# Patient Record
Sex: MALE | Race: ASIAN
[De-identification: names, ages, dates, MRNs, and addresses within clinical notes are randomized per-mention and may not be internally consistent; named-entity substitution may affect disease eponyms.]

---
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
## 2024-08-04 DIAGNOSIS — G43.109 MIGRAINE WITH AURA AND WITHOUT STATUS MIGRAINOSUS, NOT INTRACTABLE: ICD-10-CM

## 2024-08-05 RX ORDER — RIZATRIPTAN BENZOATE 10 MG/1
10 TABLET, ORALLY DISINTEGRATING ORAL ONCE AS NEEDED
Qty: 10 TABLET | Refills: 1 | Status: SHIPPED | OUTPATIENT
Start: 2024-08-05

## 2024-08-14 ENCOUNTER — TELEPHONE (OUTPATIENT)
Dept: NEUROLOGY | Facility: CLINIC | Age: 19
End: 2024-08-14

## 2024-08-14 NOTE — TELEPHONE ENCOUNTER
Spoke to patient mom, I confirmed their upcoming appointment. Patient was informed of date/time and location of visit.

## 2024-08-21 ENCOUNTER — OFFICE VISIT (OUTPATIENT)
Dept: NEUROLOGY | Facility: CLINIC | Age: 19
End: 2024-08-21
Payer: MEDICARE

## 2024-08-21 VITALS
TEMPERATURE: 98 F | WEIGHT: 166.7 LBS | DIASTOLIC BLOOD PRESSURE: 68 MMHG | HEART RATE: 59 BPM | OXYGEN SATURATION: 98 % | SYSTOLIC BLOOD PRESSURE: 112 MMHG | BODY MASS INDEX: 25.35 KG/M2

## 2024-08-21 DIAGNOSIS — G47.00 INSOMNIA: ICD-10-CM

## 2024-08-21 DIAGNOSIS — G43.109 MIGRAINE WITH AURA AND WITHOUT STATUS MIGRAINOSUS, NOT INTRACTABLE: Primary | ICD-10-CM

## 2024-08-21 PROCEDURE — 99213 OFFICE O/P EST LOW 20 MIN: CPT

## 2024-08-21 RX ORDER — RIZATRIPTAN BENZOATE 10 MG/1
10 TABLET, ORALLY DISINTEGRATING ORAL ONCE AS NEEDED
Qty: 10 TABLET | Refills: 3 | Status: SHIPPED | OUTPATIENT
Start: 2024-08-21

## 2024-08-21 NOTE — PROGRESS NOTES
[facility]  PATIENT:  Zachary Alexandre  MRN:  [id number]  :  2005  DATE OF SERVICE:  2024      Assessment/Plan:     Migraine with aura and without status migrainosus:    I had the pleasure of seeing Zachary today in the office at [facility] in [place]. He is presenting today for an office visit follow-up in regard to his migraine headaches. The patient had been previously following with Dr. Marion for his migraine headaches. The patient is currently taking magnesium 400 mg daily and riboflavin 400 mg daily for migraine preventative therapy.  He is taking Maxalt disintegrating tablets as needed for migraine abortive therapy.  The patient states since the last appointment he has only approximately 2 migraine headaches a month, and that he notes that Maxalt usually helps control the headaches.  It does not completely abort the headaches for certain but it is helping decrease the severity of them.  The patient is fine with magnesium and B2 supplementation and he does not want to try any other additional preventative medications at this time.  The patient will follow-up in 9 months time with Dr. Marion, if any questions or concerns he can always let myself or Dr. Marion now in the future.    Patient Instructions:    Headache Calendar  Please maintain a headache calendar  Consider using phone applications such as Migraine Buddy or Migraine Diary    Headache/migraine treatment:     Rescue medications (for immediate treatment of a headache):   It is ok to take ibuprofen, acetaminophen or naproxen (Advil, Tylenol,  Aleve, Excedrin) if they help your headaches you should limit these to No more than 3 times a week to avoid medication overuse/rebound headaches.     For your more moderate to severe migraines take this medication early   - Continue Maxalt-MLT (rizatriptan) 10mg disintegrating tabs - take one at the onset of headache. May repeat one time after 2 hours if pain has  not resolved.   (Max 2 a day and 10 a month)     Over the counter preventive supplements for headaches/migraines (if you try, try for 3 months straight)  (to take every day to help prevent headaches - not to take at the time of headache):  There are combo pills online of these - none of which regulated by FDA and double check dosing - take appropriate dose only once a day- prevent a migraine, migravent, mind ease, migrelief   [x] Magnesium 400mg daily (If any diarrhea or upset stomach, decrease dose  as tolerated)  [x] Riboflavin (Vitamin B2) 400mg daily (may make your urine bright/neon yellow)  - All supplements can be purchased online    Lifestyle Recommendations:  [x] SLEEP - Maintain a regular sleep schedule: Adults need at least 7-8 hours of uninterrupted a night. Maintain good sleep hygiene:  Going to bed and waking up at consistent times, avoiding excessive daytime naps, avoiding caffeinated beverages in the evening, avoid excessive stimulation in the evening and generally using bed primarily for sleeping.  One hour before bedtime would recommend turning lights down lower, decreasing your activity (may read quietly, listen to music at a low volume). When you get into bed, should eliminate all technology (no texting, emailing, playing with your phone, iPad or tablet in bed).  [x] HYDRATION - Maintain good hydration.  Drink  2L of fluid a day (4 typical small water bottles)  [x] DIET - Maintain good nutrition. In particular don't skip meals and try and eat healthy balanced meals regularly.  [x] TRIGGERS - Look for other triggers and avoid them: Limit caffeine to 1-2 cups a day or less. Avoid dietary triggers that you have noticed bring on your headaches (this could include aged cheese, peanuts, MSG, aspartame and nitrates).  [x] EXERCISE - physical exercise as we all know is good for you in many ways, and not only is good for your heart, but also is beneficial for your mental health, cognitive health and   chronic pain/headaches. I would encourage at the least 5 days of physical exercise weekly for at least 30 minutes.     Education and Follow-up  [x] Please call with any questions or concerns. Of course if any new concerning symptoms go to the emergency department.  [x] Follow up in 9 months with Dr. Marion      History of Present Illness:     For Review:    We had the pleasure of evaluating Zachary Alexandre in neurological follow up  today for headaches.  As you know,  he is a 18 y.o.   male with a past history of migraine headaches. Patient was last seen in the office at [facility] on 11/20/2023 by Dr. Marion.  When the patient was last seen by Dr. Marion, it is noted that his headache frequency was about 4 days out of the month.  The patient continued with magnesium and B2 supplementation without any issues.  For abortive therapy, patient was still taking rizatriptan which seem to be working well for him.  He had not tried to combine Tylenol with the medication which she was encouraged to do so.  Patient still struggles with issues of insomnia and pending follow-up with sleep medicine month.  The patient was to continue with magnesium and B2 supplementation for preventative therapy at this time as well.    Current medical illnesses: asthma, von Willebrand's disease, autism, OCD, ADHD, history of sleep apnea       What medications do you take or have you taken for your headaches?   Current Preventive:   Mg and B2, Prozac, hydroxyzine (mood), clonidine    Current Abortive:   Rizatriptan 10 mg     Prior Preventive:   Abilify, Failed amitriptyline due to worsening headaches and sedation, avoid beta-blockers due to history of asthma and bradycardia     Prior Abortive:   Avoid NSAIDs due to history of von Willebrand     Interval updates as of 8/21/2024:    2/30 days in the month with headaches, 2/30 days with severe headaches he states. Maxalt at the onset of headaches, does not seem to completely eliminate  the headaches. Just seems to be dulling the headaches down in intensity. Magnesium 400 mg daily and B2 400 mg daily at this time for prevention he states. Patient does not require any additional preventative medications at this time. Would like to still stick with migraine supplementation.      Reviewed old notes from physician seen in the past- see above HPI for summary of previous encounters.       Past Medical History:   Diagnosis Date    ADHD     Amplified musculoskeletal pain syndrome     Asthma     Autism     Chronic abdominal pain     Clotting disorder (HCC)     GERD (gastroesophageal reflux disease)     GI (gastrointestinal bleed)     Insomnia     Migraine     OCD (obsessive compulsive disorder)     Oppositional defiant disorder     Seizure ([facility])     Social anxiety disorder     Von Willebrand disease ([facility])        Patient Active Problem List   Diagnosis    Von Willebrands disease ([facility])    Asthma    Headache    Seizures ([facility])    Autism    Migraine with aura and without status migrainosus, not intractable    Other insomnia       Medications:      Current Outpatient Medications   Medication Sig Dispense Refill    acetaminophen (TYLENOL) 500 mg tablet Take 500 mg by mouth every 6 (six) hours as needed      albuterol (PROVENTIL HFA,VENTOLIN HFA) 90 mcg/act inhaler 2 puffs up to every 4 hours as needed with a spacer      Aminocaproic Acid 0.25 GM/ML SOLN TAKE 15 ML BY MOUTH EVERY 6 HOURS POST-OPERATIVELY FOR 5 DAYS AS NEEDED FOR BLEEDING      cetirizine (ZyrTEC) 10 MG chewable tablet Chew 10 mg daily      Cholecalciferol 1.25 MG (82558 UT) capsule Take 1 capsule by mouth once a week      cloNIDine (CATAPRES) 0.3 mg tablet Take 0.3 mg by mouth daily at bedtime      dicyclomine (BENTYL) 20 mg tablet TAKE 1/2 TABLET(10 MG) BY MOUTH EVERY 6 HOURS AS NEEDED FOR ABDOMINAL PAIN 180 tablet 1    FLUoxetine (PROzac) 20 MG tablet Take 20 mg by mouth daily At bedtime      FLUoxetine (PROzac) 40 MG capsule Take 40 mg by mouth  daily      fluticasone (FLOVENT HFA) 110 MCG/ACT inhaler       hydrOXYzine HCL (ATARAX) 25 mg tablet TAKE 1 TABLET BY MOUTH EVERY DAY AS NEEDED THEN TAKE 2 TABLETS BY MOUTH AT BEDTIME      hydrOXYzine pamoate (VISTARIL) 50 mg capsule Take 50 mg by mouth daily at bedtime as needed      ketotifen (ZADITOR) 0.025 % ophthalmic solution 1 drop 2 (two) times a day      linaCLOtide 145 MCG CAPS Take 1 capsule (145 mcg total) by mouth daily before 1st meal of the day 30 capsule 5    magnesium Oxide (MAG-OX) 400 mg TABS Take 400 mg by mouth daily at bedtime      montelukast (SINGULAIR) 10 mg tablet Take 10 mg by mouth daily at bedtime      omeprazole (PriLOSEC) 40 MG capsule TAKE 1 CAPSULE(40 MG) BY MOUTH DAILY 90 capsule 1    Riboflavin 400 MG TABS Take 1 tablet (400 mg total) by mouth daily 90 tablet 3    rizatriptan (MAXALT-MLT) 10 mg disintegrating tablet Take 1 tablet (10 mg total) by mouth once as needed for migraine for up to 1 dose May repeat in 2 hours if needed. Max 2 tabs in 24 hours 10 tablet 1    senna-docusate sodium (SENOKOT-S) 8.6-50 mg per tablet Take 1 tablet by mouth 2 (two) times a day 60 tablet 5    zaleplon (SONATA) 10 MG capsule Take 10 mg by mouth daily at bedtime      ARIPiprazole (ABILIFY) 2 mg tablet Take 2 mg by mouth daily (Patient not taking: Reported on 11/20/2023)      cetirizine (ZyrTEC) 10 mg tablet Take 10 mg by mouth daily (Patient not taking: Reported on 3/21/2023)      cloNIDine (CATAPRES) 0.1 mg tablet Take 0.1 mg by mouth daily as needed At onset of migraines (Patient not taking: Reported on 5/15/2023)      desmopressin (DDAVP NASAL) 0.01 % solution 10 mcg daily (Patient not taking: Reported on 11/20/2023)      fluticasone (FLONASE) 50 mcg/act nasal spray 1 spray daily      magnesium Oxide (MAG-OX) 400 mg TABS TAKE 1 TABLET BY MOUTH DAILY AT BEDTIME 90 tablet 3    polyethylene glycol (GLYCOLAX) 17 GM/SCOOP powder MIX 17 GM WITH 8 OUNCE OF WATER. PREPARE AND DRINK SOLUTION ONCE DAILY.       psyllium (METAMUCIL SMOOTH TEXTURE) 28 % packet Take 1 packet by mouth 2 (two) times a day (Patient not taking: Reported on 11/20/2023) 60 packet 5     No current facility-administered medications for this visit.        Allergies:      Allergies   Allergen Reactions    Dextromethorphan Other (See Comments) and Seizures     Seizure  Other reaction(s): Seizure      Nsaids GI Bleeding and Other (See Comments)     Avoids for VWD      Trazodone Other (See Comments)    Quetiapine Palpitations and Tachycardia     Other reaction(s): Tachycardia         Family History:     Family History   Problem Relation Age of Onset    Anemia Mother     Arthritis Mother     Depression Mother     Hypertension Mother     Mental illness Mother     Miscarriages / Stillbirths Mother     Stroke Mother     Vision loss Mother     Depression Father     Heart disease Father     Hypertension Father     Irritable bowel syndrome Father     Mental illness Father     Stroke Father     Hypertension Maternal Grandfather     Stroke Maternal Grandfather     Arthritis Maternal Grandmother     Depression Maternal Grandmother     Hypertension Maternal Grandmother     Stroke Maternal Grandmother     Asthma Paternal Grandmother     ADD / ADHD Sister     Anemia Sister     Asthma Sister     Depression Sister     Hearing loss Sister     Irritable bowel syndrome Sister     Learning disabilities Sister     Mental illness Sister     Anemia Sister     Arthritis Sister     Asthma Sister     Depression Sister     Hearing loss Sister     Hypertension Sister     Hypothyroidism Sister     Irritable bowel syndrome Sister     Miscarriages / Stillbirths Sister     Asthma Brother     Depression Brother     Irritable bowel syndrome Brother     Learning disabilities Brother     Mental illness Brother     Breast cancer Maternal Aunt     Breast cancer Cousin     Depression Maternal Uncle     Mental illness Maternal Uncle        Social History:     Social History      Socioeconomic History    Marital status: Single     Spouse name: Not on file    Number of children: Not on file    Years of education: Not on file    Highest education level: Not on file   Occupational History    Not on file   Tobacco Use    Smoking status: Never    Smokeless tobacco: Never   Vaping Use    Vaping status: Never Used   Substance and Sexual Activity    Alcohol use: Never    Drug use: Never    Sexual activity: Never   Other Topics Concern    Not on file   Social History Narrative    Not on file     Social Determinants of Health     Financial Resource Strain: Patient Declined (3/22/2023)    Received from [facility], [facility]    Overall Financial Resource Strain (CARDIA)     Difficulty of Paying Living Expenses: Patient declined   Food Insecurity: Food Insecurity Present (3/22/2023)    Received from [facility], [facility]    Hunger Vital Sign     Worried About Running Out of Food in the Last Year: Sometimes true     Ran Out of Food in the Last Year: Sometimes true   Transportation Needs: No Transportation Needs (3/22/2023)    Received from [facility], [facility]    PRAPARE - Transportation     Lack of Transportation (Medical): No     Lack of Transportation (Non-Medical): No   Physical Activity: Unknown (3/22/2023)    Received from [facility]    Exercise Vital Sign     Days of Exercise per Week: Patient refused     Minutes of Exercise per Session: Not on file   Stress: Stress Concern Present (3/22/2023)    Received from [facility], [facility]    [employer] [place] [employer] - Occupational Stress Questionnaire     Feeling of Stress : Rather much   Social Connections: Unknown (11/23/2023)    Received from [facility]    Social Connection and Isolation Panel [NHANES]     Frequency of Communication with Friends and  Family: Not on file     Frequency of Social Gatherings with Friends and Family: Not on file     Attends [employer] Services: Not on file     Active Member of Clubs or Organizations: No     Attends Club or Organization Meetings: Never     Marital Status: Never    Recent Concern: Social Connections - Socially Isolated (11/23/2023)    Received from [facility]    Social Connection and Isolation Panel [NHANES]     Frequency of Communication with Friends and Family: Never     Frequency of Social Gatherings with Friends and Family: Never     Attends [employer] Services: Never     Active Member of Clubs or Organizations: No     Attends Club or Organization Meetings: Never     Marital Status: Never    Intimate Partner Violence: Not At Risk (11/23/2023)    Received from [facility], [facility]    Humiliation, Afraid, Rape, and Kick questionnaire     Fear of Current or Ex-Partner: No     Emotionally Abused: No     Physically Abused: No     Sexually Abused: No   Housing Stability: Low Risk  (3/22/2023)    Received from [facility], [facility]    Housing Stability Vital Sign     Unable to Pay for Housing in the Last Year: No     Number of Places Lived in the Last Year: 1     Unstable Housing in the Last Year: No         Objective:     Physical Exam:                                                                 Vitals:            Constitutional:    /68 (BP Location: Left arm, Patient Position: Sitting, Cuff Size: Adult)   Pulse 59   Temp 98 °F (36.7 °C) (Temporal)   Wt 75.6 kg (166 lb 11.2 oz)   SpO2 98%   BMI 25.35 kg/m²   BP Readings from Last 3 Encounters:   08/21/24 112/68   12/20/23 118/64   11/20/23 118/72     Pulse Readings from Last 3 Encounters:   08/21/24 59   11/20/23 88   05/15/23 58         Well developed, well nourished, well groomed. No dysmorphic features.       Psychiatric:  Normal behavior and  appropriate affect        Neurological Examination:     Mental status/cognitive function:   Orientated to time, place and person. Recent and remote memory intact. Attention span and concentration as well as fund of knowledge are appropriate for age. Normal language and spontaneous speech.     Cranial Nerves:  II-visual fields full.   III, IV, VI-Pupils were equal, round, and reactive to light and accomodation. Extraocular movements were full and conjugate without nystagmus. Conjugate gaze, normal smooth pursuits, normal saccades   V-facial sensation symmetric.    VII-facial expression symmetric, intact forehead wrinkle, strong eye closure, symmetric smile    VIII-hearing grossly intact bilaterally   IX, X-palate elevation symmetric, no dysarthria.   XI-shoulder shrug strength intact    XII-tongue protrusion midline.    Motor Exam: symmetric bulk and tone throughout, no pronator drift. Power/strength 5/5 bilateral upper and lower extremities, no atrophy, fasciculations or abnormal movements noted.   Sensory: grossly intact light touch in all extremities.   Reflexes: brachioradialis 3+, biceps 3+, knee 3+ bilaterally  Coordination: Finger nose finger intact bilaterally, no apparent dysmetria, ataxia or tremor noted  Gait: steady casual and tandem gait.       Review of Systems:     Review of Systems   Constitutional:  Negative for appetite change, fatigue and fever.   HENT: Negative.  Negative for hearing loss, tinnitus, trouble swallowing and voice change.    Eyes: Negative.  Negative for photophobia, pain and visual disturbance.   Respiratory: Negative.  Negative for shortness of breath.    Cardiovascular: Negative.  Negative for palpitations.   Gastrointestinal: Negative.  Negative for nausea and vomiting.   Endocrine: Negative.  Negative for cold intolerance.   Genitourinary: Negative.  Negative for dysuria, frequency and urgency.   Musculoskeletal:  Negative for back pain, gait problem, myalgias, neck pain and  neck stiffness.   Skin: Negative.  Negative for rash.   Allergic/Immunologic: Negative.    Neurological:  Positive for headaches. Negative for dizziness, tremors, seizures, syncope, facial asymmetry, speech difficulty, weakness, light-headedness and numbness.   Hematological: Negative.  Does not bruise/bleed easily.   Psychiatric/Behavioral: Negative.  Negative for confusion, hallucinations and sleep disturbance.    All other systems reviewed and are negative.    I personally reviewed the ROS entered by the MA    I spent 20 minutes in total time for this visit.    Author:  Etienne Sosa PA-C 8/21/2024 9:40 AM

## 2024-08-21 NOTE — PATIENT INSTRUCTIONS
Patient Instructions:    Headache Calendar  Please maintain a headache calendar  Consider using phone applications such as Migraine Ihsan or Migraine Diary    Headache/migraine treatment:     Rescue medications (for immediate treatment of a headache):   It is ok to take ibuprofen, acetaminophen or naproxen (Advil, Tylenol,  Aleve, Excedrin) if they help your headaches you should limit these to No more than 3 times a week to avoid medication overuse/rebound headaches.     For your more moderate to severe migraines take this medication early   - Continue Maxalt-MLT (rizatriptan) 10mg disintegrating tabs - take one at the onset of headache. May repeat one time after 2 hours if pain has not resolved.   (Max 2 a day and 10 a month)     Over the counter preventive supplements for headaches/migraines (if you try, try for 3 months straight)  (to take every day to help prevent headaches - not to take at the time of headache):  There are combo pills online of these - none of which regulated by FDA and double check dosing - take appropriate dose only once a day- prevent a migraine, migravent, mind ease, migrelief   [x] Magnesium 400mg daily (If any diarrhea or upset stomach, decrease dose  as tolerated)  [x] Riboflavin (Vitamin B2) 400mg daily (may make your urine bright/neon yellow)  - All supplements can be purchased online    Lifestyle Recommendations:  [x] SLEEP - Maintain a regular sleep schedule: Adults need at least 7-8 hours of uninterrupted a night. Maintain good sleep hygiene:  Going to bed and waking up at consistent times, avoiding excessive daytime naps, avoiding caffeinated beverages in the evening, avoid excessive stimulation in the evening and generally using bed primarily for sleeping.  One hour before bedtime would recommend turning lights down lower, decreasing your activity (may read quietly, listen to music at a low volume). When you get into bed, should eliminate all technology (no texting, emailing,  playing with your phone, iPad or tablet in bed).  [x] HYDRATION - Maintain good hydration.  Drink  2L of fluid a day (4 typical small water bottles)  [x] DIET - Maintain good nutrition. In particular don't skip meals and try and eat healthy balanced meals regularly.  [x] TRIGGERS - Look for other triggers and avoid them: Limit caffeine to 1-2 cups a day or less. Avoid dietary triggers that you have noticed bring on your headaches (this could include aged cheese, peanuts, MSG, aspartame and nitrates).  [x] EXERCISE - physical exercise as we all know is good for you in many ways, and not only is good for your heart, but also is beneficial for your mental health, cognitive health and  chronic pain/headaches. I would encourage at the least 5 days of physical exercise weekly for at least 30 minutes.     Education and Follow-up  [x] Please call with any questions or concerns. Of course if any new concerning symptoms go to the emergency department.  [x] Follow up in 9 months with Dr. Marion

## 2024-08-30 ENCOUNTER — TELEPHONE (OUTPATIENT)
Dept: NEUROLOGY | Facility: CLINIC | Age: 19
End: 2024-08-30

## 2024-08-30 NOTE — TELEPHONE ENCOUNTER
LMOM for pt to r/s their 5/21 appt as Dr. Marion will no longer be in the office that day. Offered him 5/29 at 10am and any of the botox slots that day as well. Gave c/b # to r/s.

## 2024-09-03 DIAGNOSIS — K58.1 IRRITABLE BOWEL SYNDROME WITH CONSTIPATION: ICD-10-CM

## 2024-09-03 RX ORDER — SENNA AND DOCUSATE SODIUM 50; 8.6 MG/1; MG/1
1 TABLET, FILM COATED ORAL 2 TIMES DAILY
Qty: 60 TABLET | Refills: 0 | Status: SHIPPED | OUTPATIENT
Start: 2024-09-03 | End: 2025-03-02

## 2024-11-15 DIAGNOSIS — K58.1 IRRITABLE BOWEL SYNDROME WITH CONSTIPATION: ICD-10-CM

## 2024-11-18 ENCOUNTER — TELEPHONE (OUTPATIENT)
Age: 19
End: 2024-11-18

## 2024-11-18 NOTE — TELEPHONE ENCOUNTER
PA for Linzess 145MCG capsules      SUBMITTED to [employer]    via    [x]CMM-KEY: [id number]   Waiting for next steps/questions to complete prior auth.

## 2024-11-18 NOTE — TELEPHONE ENCOUNTER
PA for Linzess 145 mcg     SUBMITTED to [facility]    via    [x]CMM-KEY: [id number]  []Surescripts-Case ID #   []Availity-Auth ID # NDC #   []Faxed to plan   []Other website   []Phone call Case ID #     []PA sent as URGENT    All office notes, labs and other pertaining documents and studies sent. Clinical questions answered. Awaiting determination from insurance company.     Turnaround time for your insurance to make a decision on your Prior Authorization can take 7-21 business days.

## 2024-11-22 NOTE — TELEPHONE ENCOUNTER
PA for LINZESS APPROVED     Date(s) approved 11/19/24-11/19/25    Case #    Patient advised by          [x][employer] Message  []Phone call   []LMOM  []L/M to call office as no active Communication consent on file  []Unable to leave detailed message as VM not approved on Communication consent       Pharmacy advised by    [x]Fax  []Phone call    Approval letter scanned into Media Yes

## 2025-01-16 DIAGNOSIS — K21.9 GASTROESOPHAGEAL REFLUX DISEASE, UNSPECIFIED WHETHER ESOPHAGITIS PRESENT: ICD-10-CM

## 2025-01-16 DIAGNOSIS — R10.9 ABDOMINAL PAIN, UNSPECIFIED ABDOMINAL LOCATION: ICD-10-CM

## 2025-01-16 RX ORDER — OMEPRAZOLE 40 MG/1
40 CAPSULE, DELAYED RELEASE ORAL DAILY
Qty: 30 CAPSULE | Refills: 1 | Status: SHIPPED | OUTPATIENT
Start: 2025-01-16 | End: 2025-03-17

## 2025-02-16 DIAGNOSIS — K58.1 IRRITABLE BOWEL SYNDROME WITH CONSTIPATION: ICD-10-CM

## 2025-02-17 RX ORDER — LINACLOTIDE 145 UG/1
CAPSULE, GELATIN COATED ORAL
Qty: 90 CAPSULE | Refills: 0 | Status: SHIPPED | OUTPATIENT
Start: 2025-02-17

## 2025-04-13 DIAGNOSIS — G43.109 MIGRAINE WITH AURA AND WITHOUT STATUS MIGRAINOSUS, NOT INTRACTABLE: ICD-10-CM

## 2025-04-14 RX ORDER — RIZATRIPTAN BENZOATE 10 MG/1
TABLET, ORALLY DISINTEGRATING ORAL
Qty: 10 TABLET | Refills: 3 | Status: SHIPPED | OUTPATIENT
Start: 2025-04-14

## 2025-04-17 DIAGNOSIS — R10.9 ABDOMINAL PAIN, UNSPECIFIED ABDOMINAL LOCATION: ICD-10-CM

## 2025-04-17 DIAGNOSIS — K21.9 GASTROESOPHAGEAL REFLUX DISEASE, UNSPECIFIED WHETHER ESOPHAGITIS PRESENT: ICD-10-CM

## 2025-04-18 RX ORDER — OMEPRAZOLE 40 MG/1
40 CAPSULE, DELAYED RELEASE ORAL DAILY
Qty: 30 CAPSULE | Refills: 1 | Status: SHIPPED | OUTPATIENT
Start: 2025-04-18

## 2025-05-06 ENCOUNTER — OFFICE VISIT (OUTPATIENT)
Dept: NEUROLOGY | Facility: CLINIC | Age: 20
End: 2025-05-06
Payer: MEDICARE

## 2025-05-06 VITALS
BODY MASS INDEX: 25.73 KG/M2 | WEIGHT: 169.8 LBS | HEART RATE: 59 BPM | SYSTOLIC BLOOD PRESSURE: 116 MMHG | HEIGHT: 68 IN | OXYGEN SATURATION: 98 % | TEMPERATURE: 97.2 F | DIASTOLIC BLOOD PRESSURE: 78 MMHG

## 2025-05-06 DIAGNOSIS — G47.00 INSOMNIA: ICD-10-CM

## 2025-05-06 DIAGNOSIS — G43.109 MIGRAINE WITH AURA AND WITHOUT STATUS MIGRAINOSUS, NOT INTRACTABLE: Primary | ICD-10-CM

## 2025-05-06 PROCEDURE — 99213 OFFICE O/P EST LOW 20 MIN: CPT | Performed by: STUDENT IN AN ORGANIZED HEALTH CARE EDUCATION/TRAINING PROGRAM

## 2025-05-06 RX ORDER — LANOLIN ALCOHOL/MO/W.PET/CERES
400 CREAM (GRAM) TOPICAL
Qty: 90 TABLET | Refills: 3 | Status: SHIPPED | OUTPATIENT
Start: 2025-05-06

## 2025-05-06 NOTE — PROGRESS NOTES
Since your last visit are your headaches Worsened    Any change to the headache type? No    What is your current headache frequency (total headache days out of 30): 8/30 (of those, how many are more severe: 2)    Are you taking your current medications as prescribed? yes      Do you have any side effects? no    How may days per week do you take an abortive medicine? 2/7  Maxalt

## 2025-05-06 NOTE — PROGRESS NOTES
Neurology Ambulatory Visit  Name: Zachary Alexandre       : 2005       MRN: [id number]   Encounter Provider: Tylor Marion DO   Encounter Date: 2025  Encounter department: [facility]    Zachary Alexandre is a very pleasant 19 y.o. male with a past medical history that includes asthma, von Willebrand's disease, autism, OCD, ADHD, history of sleep apnea here for f/u evaluation of headache.     Assessment and Plan  1. Migraine with aura and without status migrainosus, not intractable  Assessment & Plan:  At today's visit, he reports a flareup of his headaches over the past 3 months.  He attributes this to declining health in his mother and increased stress since both he and his sister are helping to take care of her.  His sleep has also been impacted by this which further exacerbates his headaches.  He continues with magnesium and B2 supplements daily for prevention and rizatriptan for abortive management.  We decided to hold off on making any changes to his regimen at this time given a very clear trigger for his increase.  He will keep me updated over the next few months to let me know if he is not doing well so we can consider a prescription option.  Orders:  -     magnesium Oxide (MAG-OX) 400 mg TABS; Take 1 tablet (400 mg total) by mouth daily at bedtime  2. Insomnia      He will Return in about 8 months (around 2026).    Workup  - Neurologic assessment reveals unremarkable neurological exam.  - Sleep study 2023: No evidence of obstructive sleep apnea.  - MRI brain with and without contrast 2023: No acute findings.  No white matter changes.  No parenchymal enhancement.  Curvilinear enhancement within the right internal auditory canal is likely vascular in nature (I have personally reviewed imaging and radiology read)     Preventative:  - we discussed headache hygiene and lifestyle factors that may improve headaches  - Mg and B2 supplements  - Currently on through other  providers: Prozac, Clonidine, Hydroxyzine (mood)  - Past/ failed/contraindicated: Failed amitriptyline due to worsening headaches and sedation, avoid beta-blockers due to history of asthma and bradycardia, Abilify  - future options: Memantine, Diamox, CGRP med, botox     Acute:  - discussed not taking over-the-counter or prescription pain medications more than 3 days per week to prevent medication overuse/rebound headache  - Rizatriptan 10mg ODT (okay to combine with Tylenol)  - Currently on through other providers: Clonidine (asked him to stop taking this PRN dose for migraine as it has not been helpful)  - Past/ failed/contraindicated: Avoid NSAIDs due to history of von Willebrand  - future options:  Triptan, prochlorperazine, dexamethasone 2 mg daily for prolonged migraine, ubrelvy, reyvow, nurtec, zavzpret    History of Present Illness       Interval updates:  5/6/25: Since our last visit, he was seen by Jared in August 2024.  He had been taking magnesium and B2 supplements daily for prevention and rizatriptan for abortive management.  He has overall been doing fairly well with this regimen and wanted to continue with it. At today's visit, he feels as though his headaches have worsened about 3 months ago. He finds that his mom has needed more help with ambulating and her cognitive issues have also become more pronounced. He endorses about 8 headache days per month of which 2 are more severe.  He continues with magnesium and B2 supplements daily.  From an abortive standpoint, rizatriptan is effective when needed. Sleep is currently an issue which he attributes to his mom needing more help overnight.    Prior History  11/20/23: At todays visit, he reports some improvement in terms of his headaches. He is taking Mg and B2 supplements daily. From an abortive standpoint, he reports continued benefit with Rizatriptan. He has not yet combined this with Tylenol. With regards to sleep, he continues to struggle with  insomnia and he finds that when he doesn't sleep well, he will have more migraines.   5/15/23: Since his last visit, he has been following with sleep medicine for sleep induction maintenance insomnia.  At today's visit, he reports that his headaches have improved.  Currently experiencing about 2 headache days per week (8/30). Currently taking Mg and B2 supplements without any issues. With regards to Rizatriptan he finds about 70% relief in about 10 minutes. Tends to work most of the time for him, but can be limited when he has a debilitating headache. He does report a new headache type where he gets very fatigued during the episode. Some migrainous features with them. This started about 1 month ago.   12/21/22: Mr. Alexandre presents with a longstanding history of migraines that were previously treated by [facility] pediatric neurology.  He was previously tried on amitriptyline for preventive management but did not tolerate it well due to worsening headaches and increased sedation.  He does have difficulties with sleep and currently follows with psychiatry for insomnia as well as behavioral issues including anxiety and OCD.  He reports a prior history of sleep apnea when he was younger, and is unsure if he had a recent sleep study.  I would like to obtain a repeat sleep study for him to see if there is any evidence of ongoing obstructive sleep apnea that could certainly be contributing to his headaches.  From a preventive standpoint with regards to his headaches, I would like him to try magnesium and riboflavin supplements.  For abortive management, I will switch his rizatriptan to the dissolving tablet and have encouraged him to take it early at the onset of a severe headache instead of waiting.  He has multiple contributing factors to his headaches including a significant mental health history as well as difficulties with sleep.  As we address all of these contributing factors and find the right preventive  "medication for him I suspect he will continue to improve.     On a side note, his sister mentioned that he may have had brain imaging while he was a patient at [facility].  I have asked them to clarify if this was actually true and if so, obtain those records.  If needed in the future, we can think about obtaining an MRI.         Objective     Physical Exam:                                                               Vitals:            Constitutional:    /78 (BP Location: Left arm, Patient Position: Sitting, Cuff Size: Standard)   Pulse 59   Temp (!) 97.2 °F (36.2 °C) (Temporal)   Ht 5' 8\" (1.727 m)   Wt 77 kg (169 lb 12.8 oz)   SpO2 98%   BMI 25.82 kg/m²   BP Readings from Last 3 Encounters:   05/06/25 116/78   08/21/24 112/68   12/20/23 118/64     Pulse Readings from Last 3 Encounters:   05/06/25 59   08/21/24 59   11/20/23 88         Well developed, well nourished, well groomed. No dysmorphic features.       HEENT:  Normocephalic atraumatic. See neuro exam   Chest:  Respirations appear regular and unlabored.    Cardiovascular:  no observed significant swelling.    Musculoskeletal:  (see below under neurologic exam for evaluation of motor function and gait)   Skin:  warm and dry, not diaphoretic.    Psychiatric:  Normal behavior and appropriate affect       Neurological Examination:     Mental status/cognitive function:   Recent and remote memory intact. Attention span and concentration as well as fund of knowledge are appropriate for age. Normal language and spontaneous speech.  Cranial Nerves:  III, IV, VI-Pupils were equal, round. Extraocular movements were full and conjugate   VII-facial expression symmetric  VIII-hearing grossly intact bilaterally   Motor Exam: symmetric bulk throughout. no atrophy, fasciculations or abnormal movements noted.   Coordination:  no apparent dysmetria, ataxia or tremor noted  Gait: steady casual gait      Voice recognition software was used in the generation of " this note. There may be unintentional errors including grammatical errors, spelling errors, or pronoun errors.

## 2025-05-06 NOTE — ASSESSMENT & PLAN NOTE
At today's visit, he reports a flareup of his headaches over the past 3 months.  He attributes this to declining health in his mother and increased stress since both he and his sister are helping to take care of her.  His sleep has also been impacted by this which further exacerbates his headaches.  He continues with magnesium and B2 supplements daily for prevention and rizatriptan for abortive management.  We decided to hold off on making any changes to his regimen at this time given a very clear trigger for his increase.  He will keep me updated over the next few months to let me know if he is not doing well so we can consider a prescription option.

## 2025-05-06 NOTE — PATIENT INSTRUCTIONS
Headache Calendar  Please maintain a headache calendar  Consider using phone applications such as Migraine Ihsan or Migraine Diary    Headache/migraine treatment:   Rescue medications (for immediate treatment of a headache):   It is ok to take ibuprofen, acetaminophen or naproxen (Advil, Tylenol,  Aleve, Excedrin) if they help your headaches you should limit these to No more than 3 times a week to avoid medication overuse/rebound headaches.     For your more moderate to severe migraines take this medication early   Maxalt-MLT (rizatriptan) 10mg disintegrating tabs - take one at the onset of headache. May repeat one time after 2 hours if pain has not resolved.   (Max 2 a day and 10 a month)     Over the counter preventive supplements for headaches/migraines  [x] Magnesium 400mg daily (If any diarrhea or upset stomach, decrease dose  as tolerated)  [x] Riboflavin (Vitamin B2) 400mg daily (may make your urine bright/neon yellow)    Lifestyle Recommendations:  [x] SLEEP - Maintain a regular sleep schedule: Adults need at least 7-8 hours of uninterrupted a night. Maintain good sleep hygiene:  Going to bed and waking up at consistent times, avoiding excessive daytime naps, avoiding caffeinated beverages in the evening, avoid excessive stimulation in the evening and generally using bed primarily for sleeping.  One hour before bedtime would recommend turning lights down lower, decreasing your activity (may read quietly, listen to music at a low volume). When you get into bed, should eliminate all technology (no texting, emailing, playing with your phone, iPad or tablet in bed).  [x] HYDRATION - Maintain good hydration.  Drink  2L of fluid a day (4 typical small water bottles)  [x] DIET - Maintain good nutrition. In particular don't skip meals and try and eat healthy balanced meals regularly.  [x] TRIGGERS - Look for other triggers and avoid them: Limit caffeine to 1-2 cups a day or less. Avoid dietary triggers that you have  noticed bring on your headaches (this could include aged cheese, peanuts, MSG, aspartame and nitrates).  [x] EXERCISE - physical exercise as we all know is good for you in many ways, and not only is good for your heart, but also is beneficial for your mental health, cognitive health and  chronic pain/headaches. I would encourage at the least 5 days of physical exercise weekly for at least 30 minutes.     Education and Follow-up  [x] Please call with any questions or concerns. Of course if any new concerning symptoms go to the emergency department.  [x] Follow up in 8 months with Jared

## 2025-05-13 DIAGNOSIS — K58.1 IRRITABLE BOWEL SYNDROME WITH CONSTIPATION: ICD-10-CM

## 2025-05-13 RX ORDER — LINACLOTIDE 145 UG/1
CAPSULE, GELATIN COATED ORAL
Qty: 90 CAPSULE | Refills: 0 | Status: SHIPPED | OUTPATIENT
Start: 2025-05-13 | End: 2025-05-21 | Stop reason: DRUGHIGH

## 2025-05-21 ENCOUNTER — OFFICE VISIT (OUTPATIENT)
Dept: GASTROENTEROLOGY | Facility: CLINIC | Age: 20
End: 2025-05-21

## 2025-05-21 VITALS
HEIGHT: 68 IN | SYSTOLIC BLOOD PRESSURE: 112 MMHG | TEMPERATURE: 98.5 F | WEIGHT: 166.4 LBS | DIASTOLIC BLOOD PRESSURE: 74 MMHG | BODY MASS INDEX: 25.22 KG/M2

## 2025-05-21 DIAGNOSIS — K21.9 GASTROESOPHAGEAL REFLUX DISEASE, UNSPECIFIED WHETHER ESOPHAGITIS PRESENT: ICD-10-CM

## 2025-05-21 DIAGNOSIS — K58.1 IRRITABLE BOWEL SYNDROME WITH CONSTIPATION: Primary | ICD-10-CM

## 2025-05-21 RX ORDER — OMEPRAZOLE 40 MG/1
40 CAPSULE, DELAYED RELEASE ORAL DAILY
Qty: 90 CAPSULE | Refills: 3 | Status: SHIPPED | OUTPATIENT
Start: 2025-05-21

## 2025-05-21 RX ORDER — LINACLOTIDE 72 UG/1
1 CAPSULE, GELATIN COATED ORAL DAILY
Qty: 90 CAPSULE | Refills: 3 | Status: SHIPPED | OUTPATIENT
Start: 2025-05-21

## 2025-05-21 NOTE — ASSESSMENT & PLAN NOTE
Stable, well-controlled   Continue Prilosec 40 mg daily before breakfast  Continue GERD diet and lifestyle modifications    Orders:    omeprazole (PriLOSEC) 40 MG capsule; Take 1 capsule (40 mg total) by mouth daily before breakfast

## 2025-05-21 NOTE — PROGRESS NOTES
"Name: Zachary Alexandre      : 2005      MRN: [id number]  Encounter Provider: Penelope Tellez PA-C  Encounter Date: 2025   Encounter department: [facility] [place]  :  Assessment & Plan  Irritable bowel syndrome with constipation  He reports diarrhea when he takes Linzess 145 mcg daily  I am unsure if he is experiencing overflow diarrhea, or if Linzess is simply too strong  We can trial Linzess 72 mcg daily, advised to take this 30 to 60 minutes before breakfast to prevent diarrhea  Depending on his response, would consider obtaining abdominal x-ray to assess stool burden  Continue high-fiber diet and good hydration    Orders:    linaCLOtide (Linzess) 72 MCG CAPS; Take 1 capsule by mouth in the morning 30-60 minutes before breakfast    Gastroesophageal reflux disease, unspecified whether esophagitis present  Stable, well-controlled   Continue Prilosec 40 mg daily before breakfast  Continue GERD diet and lifestyle modifications    Orders:    omeprazole (PriLOSEC) 40 MG capsule; Take 1 capsule (40 mg total) by mouth daily before breakfast    Follow-up in 6 months    History of Present Illness   HPI  Zachary Alexandre is a 19 y.o. male who presents for follow-up of GERD and IBS.    History obtained from: patient    He takes Linzess about 1 day/week, because this causes watery diarrhea. He moves his bowels on his own every other day, or every third day. The stool varies between [place] type IV-VI. He mostly feels like he is emptying well. He denies blood in the stool and unintentional weight loss. No nausea or vomiting. Reflux is well-controlled with omeprazole.  His weight is stable.       Objective   /74   Temp 98.5 °F (36.9 °C) (Oral)   Ht 5' 8\" (1.727 m)   Wt 75.5 kg (166 lb 6.4 oz)   BMI 25.30 kg/m²      Physical Exam  Vitals and nursing note reviewed.   Constitutional:       General: He is not in acute distress.     Appearance: He is well-developed.   HENT:      " Head: Normocephalic and atraumatic.     Eyes:      Conjunctiva/sclera: Conjunctivae normal.       Cardiovascular:      Rate and Rhythm: Normal rate and regular rhythm.      Heart sounds: No murmur heard.  Pulmonary:      Effort: Pulmonary effort is normal. No respiratory distress.      Breath sounds: Normal breath sounds.   Abdominal:      Palpations: Abdomen is soft.      Tenderness: There is no abdominal tenderness.     Musculoskeletal:         General: No swelling.      Cervical back: Neck supple.     Skin:     General: Skin is warm and dry.     Neurological:      Mental Status: He is alert.     Psychiatric:         Mood and Affect: Mood normal.

## 2025-05-21 NOTE — ASSESSMENT & PLAN NOTE
He reports diarrhea when he takes Linzess 145 mcg daily  I am unsure if he is experiencing overflow diarrhea, or if Linzess is simply too strong  We can trial Linzess 72 mcg daily, advised to take this 30 to 60 minutes before breakfast to prevent diarrhea  Depending on his response, would consider obtaining abdominal x-ray to assess stool burden  Continue high-fiber diet and good hydration    Orders:    linaCLOtide (Linzess) 72 MCG CAPS; Take 1 capsule by mouth in the morning 30-60 minutes before breakfast